# Patient Record
Sex: FEMALE | Race: WHITE | NOT HISPANIC OR LATINO | Employment: FULL TIME | URBAN - METROPOLITAN AREA
[De-identification: names, ages, dates, MRNs, and addresses within clinical notes are randomized per-mention and may not be internally consistent; named-entity substitution may affect disease eponyms.]

---

## 2020-11-09 ENCOUNTER — OFFICE VISIT (OUTPATIENT)
Dept: FAMILY MEDICINE | Facility: CLINIC | Age: 45
End: 2020-11-09
Payer: COMMERCIAL

## 2020-11-09 VITALS
HEIGHT: 62 IN | SYSTOLIC BLOOD PRESSURE: 118 MMHG | BODY MASS INDEX: 22.26 KG/M2 | DIASTOLIC BLOOD PRESSURE: 82 MMHG | HEART RATE: 98 BPM | OXYGEN SATURATION: 100 % | TEMPERATURE: 98.5 F | WEIGHT: 121 LBS

## 2020-11-09 DIAGNOSIS — Z00.00 ROUTINE GENERAL MEDICAL EXAMINATION AT A HEALTH CARE FACILITY: Primary | ICD-10-CM

## 2020-11-09 DIAGNOSIS — Z12.31 ENCOUNTER FOR SCREENING MAMMOGRAM FOR BREAST CANCER: ICD-10-CM

## 2020-11-09 PROCEDURE — 99386 PREV VISIT NEW AGE 40-64: CPT | Performed by: NURSE PRACTITIONER

## 2020-11-09 RX ORDER — MULTIVITAMIN WITH IRON
1 TABLET ORAL DAILY
COMMUNITY

## 2020-11-09 ASSESSMENT — MIFFLIN-ST. JEOR: SCORE: 1154.16

## 2020-11-09 NOTE — PROGRESS NOTES
SUBJECTIVE:   CC: Melia Hernández is an 44 year old woman who presents for preventive health visit.       Patient has been advised of split billing requirements and indicates understanding: Yes  Healthy Habits:    Do you get at least three servings of calcium containing foods daily (dairy, green leafy vegetables, etc.)? yes    Amount of exercise or daily activities, outside of work: walks    Problems taking medications regularly No    Medication side effects: No    Have you had an eye exam in the past two years? yes    Do you see a dentist twice per year? yes    Do you have sleep apnea, excessive snoring or daytime drowsiness?no          Today's PHQ-2 Score: No flowsheet data found.    Abuse: Current or Past(Physical, Sexual or Emotional)- No  Do you feel safe in your environment? Yes    PAP done at [facility] in past couple years. Will request records.     Social History     Tobacco Use     Smoking status: Never Smoker   Substance Use Topics     Alcohol use: No     If you drink alcohol do you typically have >3 drinks per day or >7 drinks per week? No                     Reviewed orders with patient.  Reviewed health maintenance and updated orders accordingly - Yes  Lab work is in process    Mammogram Screening: Patient over age 50, mutual decision to screen reflected in health maintenance.    Pertinent mammograms are reviewed under the imaging tab.  History of abnormal Pap smear: NO - age 30-65 PAP every 5 years with negative HPV co-testing recommended     Reviewed and updated as needed this visit by clinical staff                 Reviewed and updated as needed this visit by Provider                Past Medical History:   Diagnosis Date     NO ACTIVE PROBLEMS       Past Surgical History:   Procedure Laterality Date     ENT SURGERY      PET     LABIALPLASTY N/A 6/26/2015    Procedure: LABIAPLASTY;  Surgeon: Carlotta Butcher MD;  Location: [facility]     NO HISTORY OF SURGERY       wisdom teeth          ROS:  CONSTITUTIONAL: NEGATIVE for fever, chills, change in weight  INTEGUMENTARU/SKIN: NEGATIVE for worrisome rashes, moles or lesions  EYES: NEGATIVE for vision changes or irritation  ENT: NEGATIVE for ear, mouth and throat problems  RESP: NEGATIVE for significant cough or SOB  BREAST: NEGATIVE for masses, tenderness or discharge  CV: NEGATIVE for chest pain, palpitations or peripheral edema  GI: NEGATIVE for nausea, abdominal pain, heartburn, or change in bowel habits  : NEGATIVE for unusual urinary or vaginal symptoms. Periods are regular.  MUSCULOSKELETAL: NEGATIVE for significant arthralgias or myalgia  NEURO: NEGATIVE for weakness, dizziness or paresthesias  PSYCHIATRIC: NEGATIVE for changes in mood or affect    OBJECTIVE:   There were no vitals taken for this visit.  EXAM:  GENERAL: healthy, alert and no distress  EYES: Eyes grossly normal to inspection, PERRL and conjunctivae and sclerae normal  HENT: ear canals and TM's normal, nose and mouth without ulcers or lesions  NECK: no adenopathy, no asymmetry, masses, or scars and thyroid normal to palpation  RESP: lungs clear to auscultation - no rales, rhonchi or wheezes  BREAST: normal without masses, tenderness or nipple discharge and no palpable axillary masses or adenopathy  CV: regular rate and rhythm, normal S1 S2, no S3 or S4, no murmur, click or rub, no peripheral edema and peripheral pulses strong  ABDOMEN: soft, nontender, no hepatosplenomegaly, no masses and bowel sounds normal  MS: no gross musculoskeletal defects noted, no edema  SKIN: no suspicious lesions or rashes  NEURO: Normal strength and tone, mentation intact and speech normal  PSYCH: mentation appears normal, affect normal/bright    Diagnostic Test Results:  Labs reviewed in Epic    ASSESSMENT/PLAN:       ICD-10-CM    1. Routine general medical examination at a health care facility  Z00.00 Lipid panel reflex to direct LDL Fasting     Comprehensive metabolic panel (BMP + Alb, Alk  "Phos, ALT, AST, Total. Bili, TP)     TSH with free T4 reflex   2. Encounter for screening mammogram for breast cancer  Z12.31 MA Screening Digital Bilateral       Patient has been advised of split billing requirements and indicates understanding: Yes  COUNSELING:   Reviewed preventive health counseling, as reflected in patient instructions  Special attention given to:        Regular exercise       Healthy diet/nutrition    Estimated body mass index is 20.67 kg/m  as calculated from the following:    Height as of 6/26/15: 1.575 m (5' 2\").    Weight as of 6/26/15: 51.3 kg (113 lb).        She reports that she has never smoked. She does not have any smokeless tobacco history on file.      Counseling Resources:  ATP IV Guidelines  Pooled Cohorts Equation Calculator  Breast Cancer Risk Calculator  BRCA-Related Cancer Risk Assessment: FHS-7 Tool  FRAX Risk Assessment  ICSI Preventive Guidelines  Dietary Guidelines for Americans, 2010  USDA's MyPlate  ASA Prophylaxis  Lung CA Screening    Eun Schroeder, KD  [facility]  "

## 2021-01-15 ENCOUNTER — HEALTH MAINTENANCE LETTER (OUTPATIENT)
Age: 46
End: 2021-01-15

## 2021-09-04 ENCOUNTER — HEALTH MAINTENANCE LETTER (OUTPATIENT)
Age: 46
End: 2021-09-04

## 2021-12-25 ENCOUNTER — HEALTH MAINTENANCE LETTER (OUTPATIENT)
Age: 46
End: 2021-12-25

## 2022-03-09 ENCOUNTER — OFFICE VISIT (OUTPATIENT)
Dept: FAMILY MEDICINE | Facility: CLINIC | Age: 47
End: 2022-03-09
Payer: COMMERCIAL

## 2022-03-09 VITALS
BODY MASS INDEX: 23 KG/M2 | SYSTOLIC BLOOD PRESSURE: 100 MMHG | TEMPERATURE: 99.8 F | HEIGHT: 62 IN | HEART RATE: 100 BPM | WEIGHT: 125 LBS | OXYGEN SATURATION: 98 % | DIASTOLIC BLOOD PRESSURE: 60 MMHG

## 2022-03-09 DIAGNOSIS — B37.31 YEAST INFECTION OF THE VAGINA: ICD-10-CM

## 2022-03-09 DIAGNOSIS — J02.9 SORE THROAT: ICD-10-CM

## 2022-03-09 DIAGNOSIS — J02.0 STREPTOCOCCAL SORE THROAT: Primary | ICD-10-CM

## 2022-03-09 LAB
DEPRECATED S PYO AG THROAT QL EIA: POSITIVE
FLUAV AG SPEC QL IA: NEGATIVE
FLUBV AG SPEC QL IA: NEGATIVE
SARS-COV-2 RNA RESP QL NAA+PROBE: NEGATIVE

## 2022-03-09 PROCEDURE — U0005 INFEC AGEN DETEC AMPLI PROBE: HCPCS | Performed by: PHYSICIAN ASSISTANT

## 2022-03-09 PROCEDURE — 87804 INFLUENZA ASSAY W/OPTIC: CPT | Performed by: PHYSICIAN ASSISTANT

## 2022-03-09 PROCEDURE — 87880 STREP A ASSAY W/OPTIC: CPT | Performed by: PHYSICIAN ASSISTANT

## 2022-03-09 PROCEDURE — 99214 OFFICE O/P EST MOD 30 MIN: CPT | Performed by: PHYSICIAN ASSISTANT

## 2022-03-09 PROCEDURE — U0003 INFECTIOUS AGENT DETECTION BY NUCLEIC ACID (DNA OR RNA); SEVERE ACUTE RESPIRATORY SYNDROME CORONAVIRUS 2 (SARS-COV-2) (CORONAVIRUS DISEASE [COVID-19]), AMPLIFIED PROBE TECHNIQUE, MAKING USE OF HIGH THROUGHPUT TECHNOLOGIES AS DESCRIBED BY CMS-2020-01-R: HCPCS | Performed by: PHYSICIAN ASSISTANT

## 2022-03-09 RX ORDER — FLUCONAZOLE 150 MG/1
150 TABLET ORAL ONCE
Qty: 1 TABLET | Refills: 0 | Status: SHIPPED | OUTPATIENT
Start: 2022-03-09 | End: 2022-03-09

## 2022-03-09 RX ORDER — AMOXICILLIN 500 MG/1
500 CAPSULE ORAL 3 TIMES DAILY
Qty: 30 CAPSULE | Refills: 0 | Status: SHIPPED | OUTPATIENT
Start: 2022-03-09 | End: 2022-03-19

## 2022-03-09 ASSESSMENT — ENCOUNTER SYMPTOMS: SORE THROAT: 1

## 2022-03-09 NOTE — PROGRESS NOTES
Assessment & Plan     Streptococcal sore throat  Sore throat  Symptomatic strep throat.  Reviewed recurrence with patient as she seems to have had positive strep tests numerous times since she turned the age of 42.  She does have fairly prominent tonsillar hypertrophy.  Advised that if she ever wanted to have a consultation with ENT she is more than welcome to reach out to our office and we can place that consult order.  - Streptococcus A Rapid Screen w/Reflex to PCR - Clinic Collect  - Symptomatic; Yes; 3/6/2022 COVID-19 Virus (Coronavirus) by PCR Nose  - Influenza A/B antigen  - amoxicillin (AMOXIL) 500 MG capsule  Dispense: 30 capsule; Refill: 0    Yeast infection of the vagina  Historically reports yeast candidiasis with antibiotic use.  Will send over fluconazole empirically.  - fluconazole (DIFLUCAN) 150 MG tablet  Dispense: 1 tablet; Refill: 0    Return in about 3 months (around 6/9/2022) for Physical Exam, PAP, Fasting labs.    [facility]   Melia is a 46 year old who presents for the following health issues     Pharyngitis     History of Present Illness       Reason for visit:  Sore throat  Symptom onset:  1-3 days ago  Symptoms include:  Sore throat, swollen tonsils    She eats 2-3 servings of fruits and vegetables daily.She consumes 0 sweetened beverage(s) daily.She exercises with enough effort to increase her heart rate 9 or less minutes per day.  She exercises with enough effort to increase her heart rate 3 or less days per week.   She is taking medications regularly.         COVID-19 Symptom Review  How many days ago did these symptoms start? 3 days    Are any of the following symptoms significant for you?    New or worsening difficulty breathing? No    Worsening cough? No    Fever or chills? No    Headache: no    Sore throat: YES    Chest pain: no    Diarrhea: no    Body aches? no    What treatments has patient tried?  Salt water  "gargles, Tylenol  Does patient live in a nursing home, group home, or shelter? no  Does patient have a way to get food/medications during quarantined? Yes, I have a friend or family member who can help me.    Review of Systems   HENT: Positive for sore throat.       Constitutional, HEENT, cardiovascular, pulmonary, GI, , musculoskeletal, neuro, skin, endocrine and psych systems are negative, except as otherwise noted.      Objective    /60   Pulse 100   Temp 99.8  F (37.7  C)   Ht 1.575 m (5' 2\")   Wt 56.7 kg (125 lb)   LMP 02/23/2022   SpO2 98%   BMI 22.86 kg/m    Body mass index is 22.86 kg/m .  Physical Exam   GENERAL: healthy, alert and no distress  EYES: Eyes grossly normal to inspection, PERRL and conjunctivae and sclerae normal  HENT: ear canals and TM's normal, nose and posterior pharynx erythematous with 3+ tonsillar hypertrophy without exudate  NECK: Bilateral anterior cervical lymphadenopathy, no asymmetry, masses, or scars and thyroid normal to palpation  RESP: lungs clear to auscultation - no rales, rhonchi or wheezes  CV: regular rate and rhythm, normal S1 S2, no S3 or S4, no murmur, click or rub, no peripheral edema and peripheral pulses strong  MS: no gross musculoskeletal defects noted, no edema  SKIN: no suspicious lesions or rashes  NEURO: Normal strength and tone, mentation intact and speech normal  PSYCH: mentation appears normal, affect normal/bright    Results for orders placed or performed in visit on 03/09/22 (from the past 24 hour(s))   Streptococcus A Rapid Screen w/Reflex to PCR - Clinic Collect    Specimen: Throat; Swab   Result Value Ref Range    Group A Strep antigen Positive (A) Negative   Influenza A/B antigen    Specimen: Nose; Swab   Result Value Ref Range    Influenza A antigen Negative Negative    Influenza B antigen Negative Negative    Narrative    Test results must be correlated with clinical data. If necessary, results should be confirmed by a molecular assay " or viral culture.

## 2022-03-09 NOTE — RESULT ENCOUNTER NOTE
Melia  Here are your recent results.      Your influenza result is negative.      As we discussed in the clinic, your strep test is positive.  Please start the amoxicillin as prescribed.      If you have any questions please do not hesitate to contact our office via phone ([phone]) or MyChart.    Miri Alba MS, PAVishnuC  [facility]

## 2022-03-10 NOTE — RESULT ENCOUNTER NOTE
Melia  Here are your recent results.      Your Covid test is negative.      If you have any questions please do not hesitate to contact our office via phone ([phone]) or MyChart.    Miri Alba MS, PA-C  [facility]

## 2022-03-16 ENCOUNTER — MYC MEDICAL ADVICE (OUTPATIENT)
Dept: FAMILY MEDICINE | Facility: CLINIC | Age: 47
End: 2022-03-16
Payer: COMMERCIAL

## 2022-03-16 DIAGNOSIS — B37.31 YEAST INFECTION OF THE VAGINA: Primary | ICD-10-CM

## 2022-03-16 NOTE — TELEPHONE ENCOUNTER
My chart message sent     America Guzman RN, BSN  [facility]

## 2022-03-22 RX ORDER — FLUCONAZOLE 150 MG/1
150 TABLET ORAL ONCE
Qty: 1 TABLET | Refills: 0 | Status: SHIPPED | OUTPATIENT
Start: 2022-03-22 | End: 2022-03-22

## 2022-03-22 NOTE — TELEPHONE ENCOUNTER
Please see my chart message below     Please review and advise     Thank you     America Guzman RN, BSN  [facility]

## 2022-04-15 ENCOUNTER — TRANSFERRED RECORDS (OUTPATIENT)
Dept: HEALTH INFORMATION MANAGEMENT | Facility: CLINIC | Age: 47
End: 2022-04-15
Payer: COMMERCIAL

## 2022-05-10 ENCOUNTER — TRANSFERRED RECORDS (OUTPATIENT)
Dept: MULTI SPECIALTY CLINIC | Facility: CLINIC | Age: 47
End: 2022-05-10

## 2022-05-10 LAB — PAP SMEAR - HIM PATIENT REPORTED: NEGATIVE

## 2022-05-11 ENCOUNTER — TRANSFERRED RECORDS (OUTPATIENT)
Dept: HEALTH INFORMATION MANAGEMENT | Facility: CLINIC | Age: 47
End: 2022-05-11

## 2022-09-26 ENCOUNTER — OFFICE VISIT (OUTPATIENT)
Dept: OBGYN | Facility: CLINIC | Age: 47
End: 2022-09-26
Payer: COMMERCIAL

## 2022-09-26 VITALS
HEIGHT: 62 IN | DIASTOLIC BLOOD PRESSURE: 70 MMHG | SYSTOLIC BLOOD PRESSURE: 108 MMHG | WEIGHT: 128 LBS | BODY MASS INDEX: 23.55 KG/M2

## 2022-09-26 DIAGNOSIS — B96.89 BACTERIAL VAGINOSIS: ICD-10-CM

## 2022-09-26 DIAGNOSIS — N89.8 VAGINAL ITCHING: Primary | ICD-10-CM

## 2022-09-26 DIAGNOSIS — N76.0 BACTERIAL VAGINOSIS: ICD-10-CM

## 2022-09-26 LAB
CLUE CELLS: PRESENT
TRICHOMONAS, WET PREP: ABNORMAL
WBC'S/HIGH POWER FIELD, WET PREP: ABNORMAL
YEAST, WET PREP: ABNORMAL

## 2022-09-26 PROCEDURE — 87591 N.GONORRHOEAE DNA AMP PROB: CPT | Performed by: OBSTETRICS & GYNECOLOGY

## 2022-09-26 PROCEDURE — 99203 OFFICE O/P NEW LOW 30 MIN: CPT | Performed by: OBSTETRICS & GYNECOLOGY

## 2022-09-26 PROCEDURE — 87491 CHLMYD TRACH DNA AMP PROBE: CPT | Performed by: OBSTETRICS & GYNECOLOGY

## 2022-09-26 PROCEDURE — 87210 SMEAR WET MOUNT SALINE/INK: CPT | Performed by: OBSTETRICS & GYNECOLOGY

## 2022-09-26 RX ORDER — NORETHINDRONE ACETATE AND ETHINYL ESTRADIOL 1MG-20(21)
1 KIT ORAL DAILY
COMMUNITY
Start: 2022-09-16

## 2022-09-26 RX ORDER — METRONIDAZOLE 500 MG/1
500 TABLET ORAL 2 TIMES DAILY
Qty: 14 TABLET | Refills: 0 | Status: SHIPPED | OUTPATIENT
Start: 2022-09-26 | End: 2022-10-03

## 2022-09-26 NOTE — NURSING NOTE
"Chief Complaint   Patient presents with     Vaginal Problem     Sore and itchy       Initial /70 (BP Location: Left arm, Patient Position: Chair, Cuff Size: Adult Regular)   Ht 1.575 m (5' 2\")   Wt 58.1 kg (128 lb)   LMP 2022   Breastfeeding No   BMI 23.41 kg/m   Estimated body mass index is 23.41 kg/m  as calculated from the following:    Height as of this encounter: 1.575 m (5' 2\").    Weight as of this encounter: 58.1 kg (128 lb).  BP completed using cuff size: regular    Questioned patient about current smoking habits.  Pt. has never smoked.          The following HM Due: NONE    Johanna Brooks CMA    "

## 2022-09-26 NOTE — PROGRESS NOTES
"  SUBJECTIVE:                                                   CC:  Patient presents with:  Vaginal Problem: Sore and itchy      HPI:  Melia Hernández is a 46 year old  (three daughters) who presents with abnormal discharge.    She has a primary OB at [facility].  Up to date on pap smears  The discharge is not malodorous.  Slightly more than her usual, just a little bit of mild external irritation.  Is on OCP x2 years to regulate her period, which had been coming every 2-3 weeks.  Had a pelvic US which was normal at the time.     Gyn History:  Patient's last menstrual period was 2022.      Using oral contraceptives and vasectomy for contraception.    Last 3 Pap and HPV Results:  at her regular clinic    PMH, PSH, Soc Hx, Fam Hx, Meds, and allergies reviewed in Epic.    OBJECTIVE:     /70 (BP Location: Left arm, Patient Position: Chair, Cuff Size: Adult Regular)   Ht 1.575 m (5' 2\")   Wt 58.1 kg (128 lb)   LMP 2022   Breastfeeding No   BMI 23.41 kg/m      Gen: Healthy appearing female, no acute distress, comfortable  Psych: mentation appears normal and affect bright  : Normal external female genitalia.  No external lesions.   SSE: Speculum exam reveals vaginal epithelium well rugated with normal physiologic discharge. Cervix appears smooth, pink, with no visible lesions.     Test Results:  Results for orders placed or performed in visit on 22 (from the past 24 hour(s))   Wet preparation    Specimen: Vagina; Swab   Result Value Ref Range    Trichomonas Absent Absent    Yeast Absent Absent    Clue Cells Present (A) Absent    WBCs/high power field 3+ (A) None       ASSESSMENT/PLAN:                                                      1. Vaginal itching  - NEISSERIA GONORRHOEA PCR  - CHLAMYDIA TRACHOMATIS PCR  - Wet preparation    2. Bacterial vaginosis  - metroNIDAZOLE (FLAGYL) 500 MG tablet; Take 1 tablet (500 mg) by mouth 2 times daily for 7 days  Dispense: 14 tablet; " Refill: 0     Luz Hardy MD, MPH  Obstetrics and Gynecology

## 2022-09-27 LAB
C TRACH DNA SPEC QL NAA+PROBE: NEGATIVE
N GONORRHOEA DNA SPEC QL NAA+PROBE: NEGATIVE

## 2022-10-22 ENCOUNTER — HEALTH MAINTENANCE LETTER (OUTPATIENT)
Age: 47
End: 2022-10-22

## 2022-12-04 ENCOUNTER — HEALTH MAINTENANCE LETTER (OUTPATIENT)
Age: 47
End: 2022-12-04

## 2023-01-19 ENCOUNTER — OFFICE VISIT (OUTPATIENT)
Dept: FAMILY MEDICINE | Facility: CLINIC | Age: 48
End: 2023-01-19
Payer: COMMERCIAL

## 2023-01-19 VITALS
DIASTOLIC BLOOD PRESSURE: 66 MMHG | TEMPERATURE: 98.8 F | HEIGHT: 62 IN | OXYGEN SATURATION: 99 % | SYSTOLIC BLOOD PRESSURE: 110 MMHG | BODY MASS INDEX: 23.79 KG/M2 | WEIGHT: 129.3 LBS | RESPIRATION RATE: 16 BRPM | HEART RATE: 112 BPM

## 2023-01-19 DIAGNOSIS — T14.8XXA BLOOD BLISTER: Primary | ICD-10-CM

## 2023-01-19 PROCEDURE — 90472 IMMUNIZATION ADMIN EACH ADD: CPT | Performed by: PHYSICIAN ASSISTANT

## 2023-01-19 PROCEDURE — 90471 IMMUNIZATION ADMIN: CPT | Performed by: PHYSICIAN ASSISTANT

## 2023-01-19 PROCEDURE — 90715 TDAP VACCINE 7 YRS/> IM: CPT | Performed by: PHYSICIAN ASSISTANT

## 2023-01-19 PROCEDURE — 10140 I&D HMTMA SEROMA/FLUID COLLJ: CPT | Performed by: PHYSICIAN ASSISTANT

## 2023-01-19 PROCEDURE — 90686 IIV4 VACC NO PRSV 0.5 ML IM: CPT | Performed by: PHYSICIAN ASSISTANT

## 2023-01-19 NOTE — PROGRESS NOTES
"  Assessment & Plan     Blood blister  Drained per procedure note below.  Pt tolerated well.  Wound care discussed in detail.  - Hematoma/Fluid Drainage [70088]      Return in about 1 week (around 1/26/2023) for evaluation if not healing as expected.    [facility] IRVING Cárdenas is a 47 year old, presenting for the following health issues:  Derm Problem      History of Present Illness       Reason for visit:  Blood blister on neck/shoulder area  Symptom onset:  3-4 weeks ago  Symptom intensity:  Mild  Symptom progression:  Worsening  Had these symptoms before:  No    She eats 2-3 servings of fruits and vegetables daily.She consumes 0 sweetened beverage(s) daily.She exercises with enough effort to increase her heart rate 9 or less minutes per day.  She exercises with enough effort to increase her heart rate 3 or less days per week.   She is taking medications regularly.       Concern - Right Blood blister on neck shoulder area  Onset: 1 Month Ago  Description: Round size of a pea   Intensity: mild  Progression of Symptoms:  worsening  Accompanying Signs & Symptoms: none  Previous history of similar problem: none  Precipitating factors:        Worsened by: none  Alleviating factors:        Improved by: none  Therapies tried and outcome: None    Review of Systems   Constitutional, HEENT, cardiovascular, pulmonary, GI, , musculoskeletal, neuro, skin, endocrine and psych systems are negative, except as otherwise noted.      Objective    /66   Pulse 112   Temp 98.8  F (37.1  C) (Tympanic)   Resp 16   Ht 1.575 m (5' 2\")   Wt 58.7 kg (129 lb 4.8 oz)   LMP 01/04/2023 (Approximate)   SpO2 99%   BMI 23.65 kg/m    Body mass index is 23.65 kg/m .  Physical Exam   GENERAL: healthy, alert and no distress  EYES: Eyes grossly normal to inspection  MS: no gross musculoskeletal defects noted, no edema  SKIN: tense, pedunculated, violaceous skin tag with blood " collection within the tissue on the right side of the neck measuring 2.5 cm at its greatest diameter just inferior to the right ear.  No evidence of infection (warmth/tenderness)  NEURO: Normal strength and tone, mentation intact and speech normal  PSYCH: mentation appears normal, affect normal/bright    PROCEDURE:   After discussing the risks, benefits and alternatives to an incision and drainage procedure - the patient elected to proceed with this procedure.  The area was prepped with a betadine solution.  The area was anesthetized with 4 cc's of 1% lidocaine.  Using a sterile technique and a #11 blade, the blister was drained and serosanguinous fluid was removed.  The tissue was then cauterized to ensure no further blood collections would form.  The tissue The patient tolerated this procedure well and there were no immediate adverse effects.  The area was then cleaned and bandaged with antibiotic ointment.  Wound care instructions was explained in detail and a printout of instructions were given to the patient.

## 2023-02-28 ENCOUNTER — MYC MEDICAL ADVICE (OUTPATIENT)
Dept: FAMILY MEDICINE | Facility: CLINIC | Age: 48
End: 2023-02-28
Payer: COMMERCIAL

## 2023-02-28 DIAGNOSIS — T14.8XXA BLOOD BLISTER: Primary | ICD-10-CM

## 2023-03-01 NOTE — TELEPHONE ENCOUNTER
Please see my chart message below     Please review and advise     Thank you     America Guzman RN, BSN  [facility] [place] [facility]

## 2023-03-02 ENCOUNTER — TELEPHONE (OUTPATIENT)
Dept: OTHER | Facility: CLINIC | Age: 48
End: 2023-03-02
Payer: COMMERCIAL

## 2023-03-02 NOTE — TELEPHONE ENCOUNTER
Who is the name of the provider: NONE - NEW pt     Person calling / Facility: Melia    Phone number: [phone]    Can we leave a detailed message on this number? Yes    Reason for call:   Pt was initially referred to [facility] for the blood blister. Pt was given our number this morning to schedule with us instead as they think its very slow leaking blood vessel that is incredibly stubborn.

## 2023-03-02 NOTE — TELEPHONE ENCOUNTER
Patient was referred to general surgery. Patient was told they do not see patients for blood blisters/hematomas.   Discussed with Dr. Saab and ok to schedule at [facility].  Please schedule with Dr. Joshi on Monday as he has some openings.    Appt note: Referral for blood blister on neck/shoulder area.    Camille SILVER, RN    [facility]  Office: [phone]  Fax: [phone]

## 2023-03-13 ENCOUNTER — TELEPHONE (OUTPATIENT)
Dept: OTHER | Facility: CLINIC | Age: 48
End: 2023-03-13

## 2023-03-13 ENCOUNTER — OFFICE VISIT (OUTPATIENT)
Dept: OTHER | Facility: CLINIC | Age: 48
End: 2023-03-13
Attending: SURGERY
Payer: COMMERCIAL

## 2023-03-13 VITALS — OXYGEN SATURATION: 100 % | DIASTOLIC BLOOD PRESSURE: 77 MMHG | HEART RATE: 96 BPM | SYSTOLIC BLOOD PRESSURE: 113 MMHG

## 2023-03-13 DIAGNOSIS — L98.9 SKIN LESION: Primary | ICD-10-CM

## 2023-03-13 PROCEDURE — G0463 HOSPITAL OUTPT CLINIC VISIT: HCPCS

## 2023-03-13 PROCEDURE — 99203 OFFICE O/P NEW LOW 30 MIN: CPT | Performed by: SURGERY

## 2023-03-13 PROCEDURE — 99212 OFFICE O/P EST SF 10 MIN: CPT | Performed by: SURGERY

## 2023-03-13 NOTE — PATIENT INSTRUCTIONS
[facility]  Pre-operative instructions    Please review the following carefully in preparation for your upcoming procedure.  You will also receive a surgery packet in the mail.  Please review the packet thoroughly, as it will provide more details.  You may contact us [phone] with any questions or concerns.    Surgery: EXCISION RIGHT SHOULDER SOFT TISSUE LESION.  Surgeon: Serge Joshi MD  Location:  [facility] - [address] [place] [address] [address], [place] [address]    Pre-procedure  You will need a preop exam.  Dr. Joshi has completed your pre op exam at this office visit.     Diet  Do not eat anything 8 hours prior to arrival time (This includes gum and mints).   You may have clear liquids until 2 hours prior to arrival time.   Clear liquids include water, soda, apple or cranberry juice, Jello, popsicles, black coffee or tea.  Dairy products and Tomato products are NOT considered Clear Liquids)   Our surgery scheduler will confirm these times for you when you are scheduled.      Medications  Your PCP will review your medication with you at your preop exam.  They will discuss if you need to adjust or hold any medications prior to your surgery.  If you are taking a blood thinner, you may need to stop taking it prior to your procedure.  Blood thinner name:  NOT ON BLOOD THINNER NOR ASPIRIN.      Additional Information  You will need a responsible adult drive you home when your are discharged from the hospital and stay with you for 24 to 48 hours.   Take a shower or bath the morning of surgery.  Please remove deodorant, cologne, lotion, makeup, nail polish, jewelry, watches.  It is sometimes necessary to adjust the surgery schedule due to emergencies and unforeseen changes.  If your surgery is affected by this, we greatly appreciate your flexibility and understanding in this matter.  Please allow 48 hours for refills on medications.  Do not call the on-call doctor for a refill on your  pain medications.  You will see the surgeon/NP approximately 2 weeks after your procedure for a post-op appointment.   NADEEM Coffey, RN  [facility]  Office:  [phone] Fax: [phone]

## 2023-03-13 NOTE — PROGRESS NOTES
Patient is here to discuss consult    /77 (BP Location: Left arm, Patient Position: Chair, Cuff Size: Adult Regular)   Pulse 96   LMP 01/04/2023 (Approximate)   SpO2 100%     Questions patient would like addressed today are: N/A.    Refills are needed: No    Has homecare services and agency name:  [facility]

## 2023-03-13 NOTE — PROGRESS NOTES
I had the pleasure of seeing Mrs. Melia Hernández in the vascular surgery clinic today.  She is a very pleasant 47-year-old female who had developed a blood blister in the right trapezius area.  It has been excised twice but has probably recurred.    On my examination: Vital signs reviewed.  Mental: Alert and oriented x4, judgment is adequate.  HEENT: Head is atraumatic and normocephalic, mucosa pink.  Lymphatics: No supraclavicular or cervical adenopathy noted.  Cardiac: Regular rate and rhythm, S1 plus S2 +0.  Chest: Clear to auscultation bilaterally.  There is a 4 mm blood blister in the right trapezius area.    Patient only previous surgery was done under general anesthesia and patient did not have any problems with anesthesia or scarring.  She tolerates local anesthetic quite well.    We will plan on doing an operative excision in the surgical suite.

## 2023-03-15 NOTE — TELEPHONE ENCOUNTER
SALVATORE for patient to call to inform her of her scheduled surgery date/time of Tuesday, 3/29/23 @ 7:30am with a check-in [facility] of 5:30am [facility].    The pre-op physical was done on 3/13/23 by Dr. Joshi.    Patient needs to be scheduled for a post-op appointment.

## 2023-03-24 NOTE — TELEPHONE ENCOUNTER
Patient requested April.  Surgery rescheduled to 04/21/23.  Notified patient of new date/times and changed pre-op.  Informed patient she will need to get a new pre-op.

## 2023-04-01 ENCOUNTER — HEALTH MAINTENANCE LETTER (OUTPATIENT)
Age: 48
End: 2023-04-01

## 2023-04-21 ENCOUNTER — ANESTHESIA (OUTPATIENT)
Dept: SURGERY | Facility: CLINIC | Age: 48
End: 2023-04-21
Payer: COMMERCIAL

## 2023-04-21 ENCOUNTER — HOSPITAL ENCOUNTER (OUTPATIENT)
Facility: CLINIC | Age: 48
Discharge: HOME | End: 2023-04-21
Attending: SURGERY | Admitting: SURGERY
Payer: COMMERCIAL

## 2023-04-21 ENCOUNTER — ANESTHESIA EVENT (OUTPATIENT)
Dept: SURGERY | Facility: CLINIC | Age: 48
End: 2023-04-21
Payer: COMMERCIAL

## 2023-04-21 ENCOUNTER — APPOINTMENT (OUTPATIENT)
Dept: SURGERY | Facility: PHYSICIAN GROUP | Age: 48
End: 2023-04-21
Payer: COMMERCIAL

## 2023-04-21 VITALS
RESPIRATION RATE: 16 BRPM | TEMPERATURE: 98.9 F | BODY MASS INDEX: 23.98 KG/M2 | WEIGHT: 127 LBS | SYSTOLIC BLOOD PRESSURE: 99 MMHG | OXYGEN SATURATION: 99 % | DIASTOLIC BLOOD PRESSURE: 60 MMHG | HEIGHT: 61 IN | HEART RATE: 74 BPM

## 2023-04-21 DIAGNOSIS — L98.9 SKIN LESION: Primary | ICD-10-CM

## 2023-04-21 DIAGNOSIS — N90.60 HYPERTROPHY OF LABIA: Primary | ICD-10-CM

## 2023-04-21 LAB — HCG UR QL: NEGATIVE

## 2023-04-21 PROCEDURE — 11421 EXC H-F-NK-SP B9+MARG 0.6-1: CPT | Performed by: SURGERY

## 2023-04-21 PROCEDURE — 710N000009 HC RECOVERY PHASE 1, LEVEL 1, PER MIN: Performed by: SURGERY

## 2023-04-21 PROCEDURE — 710N000012 HC RECOVERY PHASE 2, PER MINUTE: Performed by: SURGERY

## 2023-04-21 PROCEDURE — 360N000075 HC SURGERY LEVEL 2, PER MIN: Performed by: SURGERY

## 2023-04-21 PROCEDURE — 88341 IMHCHEM/IMCYTCHM EA ADD ANTB: CPT | Mod: 26 | Performed by: PATHOLOGY

## 2023-04-21 PROCEDURE — 370N000017 HC ANESTHESIA TECHNICAL FEE, PER MIN: Performed by: SURGERY

## 2023-04-21 PROCEDURE — 272N000001 HC OR GENERAL SUPPLY STERILE: Performed by: SURGERY

## 2023-04-21 PROCEDURE — 258N000003 HC RX IP 258 OP 636: Performed by: ANESTHESIOLOGY

## 2023-04-21 PROCEDURE — 250N000009 HC RX 250: Performed by: NURSE ANESTHETIST, CERTIFIED REGISTERED

## 2023-04-21 PROCEDURE — 250N000011 HC RX IP 250 OP 636: Performed by: SURGERY

## 2023-04-21 PROCEDURE — 81025 URINE PREGNANCY TEST: CPT | Performed by: ANESTHESIOLOGY

## 2023-04-21 PROCEDURE — 250N000011 HC RX IP 250 OP 636: Performed by: NURSE ANESTHETIST, CERTIFIED REGISTERED

## 2023-04-21 PROCEDURE — 88305 TISSUE EXAM BY PATHOLOGIST: CPT | Mod: 26 | Performed by: PATHOLOGY

## 2023-04-21 PROCEDURE — 250N000009 HC RX 250: Performed by: SURGERY

## 2023-04-21 PROCEDURE — 258N000003 HC RX IP 258 OP 636: Performed by: NURSE ANESTHETIST, CERTIFIED REGISTERED

## 2023-04-21 PROCEDURE — 999N000141 HC STATISTIC PRE-PROCEDURE NURSING ASSESSMENT: Performed by: SURGERY

## 2023-04-21 PROCEDURE — 88305 TISSUE EXAM BY PATHOLOGIST: CPT | Mod: TC | Performed by: SURGERY

## 2023-04-21 PROCEDURE — 88342 IMHCHEM/IMCYTCHM 1ST ANTB: CPT | Mod: 26 | Performed by: PATHOLOGY

## 2023-04-21 RX ORDER — TRAMADOL HYDROCHLORIDE 50 MG/1
50 TABLET ORAL EVERY 6 HOURS PRN
Qty: 20 TABLET | Refills: 1 | Status: SHIPPED | OUTPATIENT
Start: 2023-04-21 | End: 2023-05-01

## 2023-04-21 RX ORDER — DEXMEDETOMIDINE HYDROCHLORIDE 4 UG/ML
INJECTION, SOLUTION INTRAVENOUS PRN
Status: DISCONTINUED | OUTPATIENT
Start: 2023-04-21 | End: 2023-04-21

## 2023-04-21 RX ORDER — ACETAMINOPHEN 325 MG/1
650 TABLET ORAL
Status: DISCONTINUED | OUTPATIENT
Start: 2023-04-21 | End: 2023-04-21 | Stop reason: HOSPADM

## 2023-04-21 RX ORDER — SODIUM CHLORIDE, SODIUM LACTATE, POTASSIUM CHLORIDE, CALCIUM CHLORIDE 600; 310; 30; 20 MG/100ML; MG/100ML; MG/100ML; MG/100ML
INJECTION, SOLUTION INTRAVENOUS CONTINUOUS
Status: DISCONTINUED | OUTPATIENT
Start: 2023-04-21 | End: 2023-04-21 | Stop reason: HOSPADM

## 2023-04-21 RX ORDER — LIDOCAINE HYDROCHLORIDE AND EPINEPHRINE 10; 10 MG/ML; UG/ML
INJECTION, SOLUTION INFILTRATION; PERINEURAL PRN
Status: DISCONTINUED | OUTPATIENT
Start: 2023-04-21 | End: 2023-04-21 | Stop reason: HOSPADM

## 2023-04-21 RX ORDER — MEPERIDINE HYDROCHLORIDE 25 MG/ML
12.5 INJECTION INTRAMUSCULAR; INTRAVENOUS; SUBCUTANEOUS EVERY 5 MIN PRN
Status: DISCONTINUED | OUTPATIENT
Start: 2023-04-21 | End: 2023-04-21 | Stop reason: HOSPADM

## 2023-04-21 RX ORDER — OXYCODONE HYDROCHLORIDE 5 MG/1
5 TABLET ORAL
Status: DISCONTINUED | OUTPATIENT
Start: 2023-04-21 | End: 2023-04-21 | Stop reason: HOSPADM

## 2023-04-21 RX ORDER — LIDOCAINE HYDROCHLORIDE 20 MG/ML
INJECTION, SOLUTION INFILTRATION; PERINEURAL PRN
Status: DISCONTINUED | OUTPATIENT
Start: 2023-04-21 | End: 2023-04-21

## 2023-04-21 RX ORDER — PROPOFOL 10 MG/ML
INJECTION, EMULSION INTRAVENOUS CONTINUOUS PRN
Status: DISCONTINUED | OUTPATIENT
Start: 2023-04-21 | End: 2023-04-21

## 2023-04-21 RX ORDER — MAGNESIUM HYDROXIDE 1200 MG/15ML
LIQUID ORAL PRN
Status: DISCONTINUED | OUTPATIENT
Start: 2023-04-21 | End: 2023-04-21 | Stop reason: HOSPADM

## 2023-04-21 RX ORDER — HYDROMORPHONE HCL IN WATER/PF 6 MG/30 ML
0.4 PATIENT CONTROLLED ANALGESIA SYRINGE INTRAVENOUS EVERY 5 MIN PRN
Status: DISCONTINUED | OUTPATIENT
Start: 2023-04-21 | End: 2023-04-21 | Stop reason: HOSPADM

## 2023-04-21 RX ORDER — ACETAMINOPHEN 325 MG/1
650 TABLET ORAL EVERY 4 HOURS PRN
Qty: 50 TABLET | Refills: 0 | Status: SHIPPED | OUTPATIENT
Start: 2023-04-21 | End: 2023-05-04

## 2023-04-21 RX ORDER — HYDRALAZINE HYDROCHLORIDE 20 MG/ML
2.5-5 INJECTION INTRAMUSCULAR; INTRAVENOUS
Status: DISCONTINUED | OUTPATIENT
Start: 2023-04-21 | End: 2023-04-21 | Stop reason: HOSPADM

## 2023-04-21 RX ORDER — CEFAZOLIN SODIUM/WATER 2 G/20 ML
2 SYRINGE (ML) INTRAVENOUS
Status: COMPLETED | OUTPATIENT
Start: 2023-04-21 | End: 2023-04-21

## 2023-04-21 RX ORDER — FENTANYL CITRATE 0.05 MG/ML
50 INJECTION, SOLUTION INTRAMUSCULAR; INTRAVENOUS EVERY 5 MIN PRN
Status: DISCONTINUED | OUTPATIENT
Start: 2023-04-21 | End: 2023-04-21 | Stop reason: HOSPADM

## 2023-04-21 RX ORDER — ONDANSETRON 4 MG/1
4 TABLET, ORALLY DISINTEGRATING ORAL EVERY 30 MIN PRN
Status: DISCONTINUED | OUTPATIENT
Start: 2023-04-21 | End: 2023-04-21 | Stop reason: HOSPADM

## 2023-04-21 RX ORDER — FENTANYL CITRATE 50 UG/ML
INJECTION, SOLUTION INTRAMUSCULAR; INTRAVENOUS PRN
Status: DISCONTINUED | OUTPATIENT
Start: 2023-04-21 | End: 2023-04-21

## 2023-04-21 RX ORDER — ONDANSETRON 2 MG/ML
INJECTION INTRAMUSCULAR; INTRAVENOUS PRN
Status: DISCONTINUED | OUTPATIENT
Start: 2023-04-21 | End: 2023-04-21

## 2023-04-21 RX ORDER — ONDANSETRON 2 MG/ML
4 INJECTION INTRAMUSCULAR; INTRAVENOUS EVERY 30 MIN PRN
Status: DISCONTINUED | OUTPATIENT
Start: 2023-04-21 | End: 2023-04-21 | Stop reason: HOSPADM

## 2023-04-21 RX ORDER — FENTANYL CITRATE 0.05 MG/ML
25 INJECTION, SOLUTION INTRAMUSCULAR; INTRAVENOUS EVERY 5 MIN PRN
Status: DISCONTINUED | OUTPATIENT
Start: 2023-04-21 | End: 2023-04-21 | Stop reason: HOSPADM

## 2023-04-21 RX ORDER — LABETALOL HYDROCHLORIDE 5 MG/ML
5 INJECTION, SOLUTION INTRAVENOUS
Status: DISCONTINUED | OUTPATIENT
Start: 2023-04-21 | End: 2023-04-21 | Stop reason: HOSPADM

## 2023-04-21 RX ORDER — OXYCODONE HYDROCHLORIDE 5 MG/1
10 TABLET ORAL
Status: DISCONTINUED | OUTPATIENT
Start: 2023-04-21 | End: 2023-04-21 | Stop reason: HOSPADM

## 2023-04-21 RX ORDER — HYDROMORPHONE HCL IN WATER/PF 6 MG/30 ML
0.2 PATIENT CONTROLLED ANALGESIA SYRINGE INTRAVENOUS EVERY 5 MIN PRN
Status: DISCONTINUED | OUTPATIENT
Start: 2023-04-21 | End: 2023-04-21 | Stop reason: HOSPADM

## 2023-04-21 RX ORDER — LIDOCAINE 40 MG/G
CREAM TOPICAL
Status: DISCONTINUED | OUTPATIENT
Start: 2023-04-21 | End: 2023-04-21 | Stop reason: HOSPADM

## 2023-04-21 RX ADMIN — MIDAZOLAM 2 MG: 1 INJECTION INTRAMUSCULAR; INTRAVENOUS at 11:38

## 2023-04-21 RX ADMIN — PHENYLEPHRINE HYDROCHLORIDE 100 MCG: 10 INJECTION INTRAVENOUS at 11:58

## 2023-04-21 RX ADMIN — LIDOCAINE HYDROCHLORIDE 60 MG: 20 INJECTION, SOLUTION INFILTRATION; PERINEURAL at 11:40

## 2023-04-21 RX ADMIN — ONDANSETRON 4 MG: 2 INJECTION INTRAMUSCULAR; INTRAVENOUS at 12:05

## 2023-04-21 RX ADMIN — DEXMEDETOMIDINE HYDROCHLORIDE 20 MCG: 200 INJECTION INTRAVENOUS at 11:44

## 2023-04-21 RX ADMIN — FENTANYL CITRATE 50 MCG: 50 INJECTION, SOLUTION INTRAMUSCULAR; INTRAVENOUS at 11:38

## 2023-04-21 RX ADMIN — SODIUM CHLORIDE, POTASSIUM CHLORIDE, SODIUM LACTATE AND CALCIUM CHLORIDE: 600; 310; 30; 20 INJECTION, SOLUTION INTRAVENOUS at 10:03

## 2023-04-21 RX ADMIN — PROPOFOL 100 MCG/KG/MIN: 10 INJECTION, EMULSION INTRAVENOUS at 11:40

## 2023-04-21 RX ADMIN — PHENYLEPHRINE HYDROCHLORIDE 100 MCG: 10 INJECTION INTRAVENOUS at 12:16

## 2023-04-21 RX ADMIN — Medication 2 G: at 11:39

## 2023-04-21 ASSESSMENT — ACTIVITIES OF DAILY LIVING (ADL)
ADLS_ACUITY_SCORE: 35
ADLS_ACUITY_SCORE: 35
ADLS_ACUITY_SCORE: 33

## 2023-04-21 NOTE — DISCHARGE INSTRUCTIONS
Same Day Surgery Discharge Instructions for  Sedation and General Anesthesia     It's not unusual to feel dizzy, light-headed or faint for up to 24 hours after surgery or while taking pain medication.  If you have these symptoms: sit for a few minutes before standing and have someone assist you when you get up to walk or use the bathroom.    You should rest and relax for the next 24 hours. We recommend you make arrangements to have an adult stay with you for at least 24 hours after your discharge.  Avoid hazardous and strenuous activity.    DO NOT DRIVE any vehicle or operate mechanical equipment for 24 hours following the end of your surgery.  Even though you may feel normal, your reactions may be affected by the medication you have received.    Do not drink alcoholic beverages for 24 hours following surgery.     Slowly progress to your regular diet as you feel able. It's not unusual to feel nauseated and/or vomit after receiving anesthesia.  If you develop these symptoms, drink clear liquids (apple juice, ginger ale, broth, 7-up, etc. ) until you feel better.  If your nausea and vomiting persists for 24 hours, please notify your surgeon.      All narcotic pain medications, along with inactivity and anesthesia, can cause constipation. Drinking plenty of liquids and increasing fiber intake will help.    For any questions of a medical nature, call your surgeon.    Do not make important decisions for 24 hours.    If you had general anesthesia, you may have a sore throat for a couple of days related to the breathing tube used during surgery.  You may use Cepacol lozenges to help with this discomfort.  If it worsens or if you develop a fever, contact your surgeon.     If you feel your pain is not well managed with the pain medications prescribed by your surgeon, please contact your surgeon's office to let them know so they can address your concerns.      Reasons to contact your surgeon:    Signs of possible infection:  Check your incision daily for redness, swelling, warmth, red streaks or foul drainage.   Elevated temperature.  Pain not controlled with pain medication and/or rest.   Uncontrolled nausea or vomiting.  Any questions or concerns.       **If you have questions or concerns about your procedure  call Dr. Joshi at [phone]**

## 2023-04-21 NOTE — ANESTHESIA PREPROCEDURE EVALUATION
Anesthesia Pre-Procedure Evaluation    Patient: Melia Hernández   MRN: [id number] : 1975        Procedure : Procedure(s):  EXCISION SOFT TISSUE LESION RIGHT SHOULDER          Past Medical History:   Diagnosis Date     NO ACTIVE PROBLEMS       Past Surgical History:   Procedure Laterality Date     ENT SURGERY      PET     LABIALPLASTY N/A 2015    Procedure: LABIAPLASTY;  Surgeon: Carlotta Butcher MD;  Location: [facility]     NO HISTORY OF SURGERY       wisdom teeth        No Known Allergies   Social History     Tobacco Use     Smoking status: Never     Smokeless tobacco: Never   Vaping Use     Vaping status: Not on file   Substance Use Topics     Alcohol use: Yes     Comment: socially      Wt Readings from Last 1 Encounters:   23 57.6 kg (127 lb)        Anesthesia Evaluation            ROS/MED HX  ENT/Pulmonary:  - neg pulmonary ROS  (-) sleep apnea   Neurologic:  - neg neurologic ROS     Cardiovascular:  - neg cardiovascular ROS     METS/Exercise Tolerance:     Hematologic:       Musculoskeletal:       GI/Hepatic:  - neg GI/hepatic ROS  (-) GERD   Renal/Genitourinary:  - neg Renal ROS     Endo:  - neg endo ROS     Psychiatric/Substance Use:       Infectious Disease:       Malignancy:       Other:            Physical Exam    Airway        Mallampati: II   TM distance: > 3 FB   Neck ROM: full   Mouth opening: > 3 cm    Respiratory Devices and Support         Dental           Cardiovascular          Rhythm and rate: regular     Pulmonary           breath sounds clear to auscultation           OUTSIDE LABS:  CBC:   Lab Results   Component Value Date    WBC 13.5 (H) 2005    HGB 11.4 (L) 2005    HGB 11.8 2005    HCT 34.4 (L) 2005     2005     BMP: No results found for: NA, POTASSIUM, CHLORIDE, CO2, BUN, CR, GLC  COAGS:   Lab Results   Component Value Date    PTT 28 2005    INR 0.99 2005    FIBR 393 2005     POC:   Lab Results   Component  Value Date    HCG Negative 04/21/2023     HEPATIC: No results found for: ALBUMIN, PROTTOTAL, ALT, AST, GGT, ALKPHOS, BILITOTAL, BILIDIRECT, DONATO  OTHER: No results found for: PH, LACT, A1C, ROSENDO, PHOS, MAG, LIPASE, AMYLASE, TSH, T4, T3, CRP, SED    Anesthesia Plan    ASA Status:  1      Anesthesia Type: MAC.              Consents    Anesthesia Plan(s) and associated risks, benefits, and realistic alternatives discussed. Questions answered and patient/representative(s) expressed understanding.    - Discussed:     - Discussed with:  Patient         Postoperative Care    Pain management: Multi-modal analgesia.   PONV prophylaxis: Ondansetron (or other 5HT-3)     Comments:    Other Comments: No preoperative H&P             Selwyn Thomas MD

## 2023-04-21 NOTE — OP NOTE
Preoperative diagnosis: Recurrent bleeding from right shoulder soft tissue lesion.    Postoperative diagnosis: Same.    Procedure: Excision of soft tissue lesion right shoulder.    Surgeon: Serge Joshi M.D.   Assistant: Jose Vela M.D.     Anesthesia: Monitored anesthesia care with local.  Estimated blood loss about 10 mL.  Specimens: Soft tissue lesion.    Indications for procedure: This a 47-year-old female with a lesion that had recurrent bleeding.  It was sclerosed and draining but that continued with the same problem.  Excision is advised.    Procedure details: Patient was identified and then taken to the operating room and placed in supine position.  The right neck and shoulder were prepped and a sterile surgical field was created.  Preprocedure timeout was conducted.  Preoperative intravenous antibiotics were administered.    This lesion was located right at the junction of the neck and supraclavicular fossa.  First we made an ellipse to wang it.  The ellipse measured 1 cm x 3-1/2 cm.  Generous local anesthetic was administered.  The lesion was then sharply excised with a #15 blade.  Adequate hemostasis was confirmed and the wound was closed with 3-0 Vicryl 4-0 Monocryl followed by application of Mastisol and Steri-Strips.  The specimen was handed off for permanent pathology.    Counts of instruments, sponges and needles were noted to be correct.    Patient was taken to recovery room in stable condition.    I called the patient's  and updated him on her progress.

## 2023-04-21 NOTE — ANESTHESIA CARE TRANSFER NOTE
Patient: Melia Hernández    Procedure: Procedure(s):  EXCISION SOFT TISSUE LESION RIGHT SHOULDER       Diagnosis: Skin lesion [L98.9]  Diagnosis Additional Information: No value filed.    Anesthesia Type:   MAC     Note:    Oropharynx: oropharynx clear of all foreign objects  Level of Consciousness: awake  Oxygen Supplementation: face mask    Independent Airway: airway patency satisfactory and stable  Dentition: dentition unchanged  Vital Signs Stable: post-procedure vital signs reviewed and stable  Report to RN Given: handoff report given  Patient transferred to: PACU    Handoff Report: Identifed the Patient, Identified the Reponsible Provider, Reviewed the pertinent medical history, Discussed the surgical course, Reviewed Intra-OP anesthesia mangement and issues during anesthesia, Set expectations for post-procedure period and Allowed opportunity for questions and acknowledgement of understanding      Vitals:  Vitals Value Taken Time   BP     Temp     Pulse 89 04/21/23 1224   Resp 16 04/21/23 1224   SpO2 100 % 04/21/23 1224   Vitals shown include unvalidated device data.    Electronically Signed By: RODRI Stahl CRNA  April 21, 2023  12:26 PM

## 2023-04-21 NOTE — ANESTHESIA POSTPROCEDURE EVALUATION
Patient: Melia Hernández    Procedure: Procedure(s):  EXCISION SOFT TISSUE LESION RIGHT SHOULDER       Anesthesia Type:  MAC    Note:     Postop Pain Control: Uneventful            Sign Out: Well controlled pain   PONV:    Neuro/Psych: Uneventful            Sign Out: Acceptable/Baseline neuro status   Airway/Respiratory: Uneventful            Sign Out: Acceptable/Baseline resp. status   CV/Hemodynamics: Uneventful            Sign Out: Acceptable CV status; No obvious hypovolemia; No obvious fluid overload   Other NRE:    DID A NON-ROUTINE EVENT OCCUR?            Last vitals:  Vitals Value Taken Time   BP 99/52 04/21/23 1300   Temp     Pulse 87 04/21/23 1308   Resp 19 04/21/23 1308   SpO2 100 % 04/21/23 1308   Vitals shown include unvalidated device data.    Electronically Signed By: Selwyn Thomas MD  April 21, 2023  1:44 PM

## 2023-04-21 NOTE — BRIEF OP NOTE
[facility]    Brief Operative Note    Pre-operative diagnosis: Skin lesion [L98.9]  Post-operative diagnosis Same as pre-operative diagnosis    Procedure: Procedure(s):  EXCISION SOFT TISSUE LESION RIGHT SHOULDER  Surgeon: Surgeon(s) and Role:     * Serge Joshi MD - Primary     * Jose Vela MD - Resident - Assisting  Anesthesia: MAC with Local   Estimated Blood Loss: Minimal    Drains: None  Specimens:   ID Type Source Tests Collected by Time Destination   1 : soft tissue lesion on right neck Tissue Neck, Right SURGICAL PATHOLOGY EXAM Serge Joshi MD 4/21/2023 11:59 AM      Findings:   Vascular lesion of left shoulder  Complications: None.  Implants: * No implants in log *

## 2023-04-24 ENCOUNTER — TELEPHONE (OUTPATIENT)
Dept: OTHER | Facility: CLINIC | Age: 48
End: 2023-04-24
Payer: COMMERCIAL

## 2023-04-24 NOTE — TELEPHONE ENCOUNTER
----- Message from Serge Joshi MD sent at 4/21/2023 12:43 PM CDT -----  Regarding: follow up  Follow up in [place] on May 4th please  K    Pt already scheduled for May 8th in [place], pt to be called and offered May 4th in [place] instead per MD request and pt ease of location.     Pt called and coordinated change of appt to 5/4/23 at 3:30 in [place] with Dr. Joshi. Pt verbalized understanding.     KRUNAL CoffeyN, RN  [facility]  Office:  [phone] Fax: [phone]

## 2023-04-26 LAB
PATH REPORT.COMMENTS IMP SPEC: NORMAL
PATH REPORT.COMMENTS IMP SPEC: NORMAL
PATH REPORT.FINAL DX SPEC: NORMAL
PATH REPORT.GROSS SPEC: NORMAL
PATH REPORT.MICROSCOPIC SPEC OTHER STN: NORMAL
PATH REPORT.RELEVANT HX SPEC: NORMAL
PHOTO IMAGE: NORMAL

## 2023-05-02 NOTE — H&P
Presenting complaint: Soft tissue lesion in the right shoulder area.    History of presenting illness this is a very pleasant 47-year-old female who had been referred to us by Miri Alba PA-C.  It has been drained once previously in January but has recurred.  This appears quite vascular and therefore the patient was referred to vascular surgery service for further input and management.    Past medical history: Patient denies any other growths anywhere else in the body.  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    Past surgical history: LASEK in 2006.    Allergies: No known drug allergies.    On examination: She appears comfortable and she is in no acute distress.  Vital signs are reviewed.  HEENT: Head is atraumatic and normocephalic, mucosa pink.  Mental: Alert and oriented x4, judgment and insight are good.  Cardiac: Regular rate and rhythm, S1 plus S2 +0.  Chest: Clear to auscultation bilaterally.  Skin: Pedunculated skin lesion noted around the right shoulder and trapezius area.  It is well vascularized and clinically appears consistent with a benign vascular tumor.    Diagnosis: Right shoulder glomus tumor.    Plan: I explained the findings to the patient and detail.  We will proceed with surgical excision.

## 2023-05-04 ENCOUNTER — OFFICE VISIT (OUTPATIENT)
Dept: SURGERY | Facility: CLINIC | Age: 48
End: 2023-05-04
Payer: COMMERCIAL

## 2023-05-04 VITALS
SYSTOLIC BLOOD PRESSURE: 104 MMHG | DIASTOLIC BLOOD PRESSURE: 64 MMHG | OXYGEN SATURATION: 99 % | HEIGHT: 61 IN | HEART RATE: 86 BPM | WEIGHT: 127 LBS | RESPIRATION RATE: 16 BRPM | BODY MASS INDEX: 23.98 KG/M2

## 2023-05-04 DIAGNOSIS — Z09 SURGICAL FOLLOWUP VISIT: Primary | ICD-10-CM

## 2023-05-04 PROCEDURE — 99024 POSTOP FOLLOW-UP VISIT: CPT | Performed by: SURGERY

## 2023-05-08 NOTE — PROGRESS NOTES
Mrs. Hernández is a 47-year-old female who had a skin lesion excised around the right shoulder on 21 April 2023.  She has no complaints.  The pathology was that of a benign glomus tumor.    The surgical site is healing well.      She can follow-up as needed I have discussed the pathology findings with her in detail.

## 2023-05-11 ENCOUNTER — TRANSFERRED RECORDS (OUTPATIENT)
Dept: HEALTH INFORMATION MANAGEMENT | Facility: CLINIC | Age: 48
End: 2023-05-11

## 2023-05-11 LAB
CHOLESTEROL (EXTERNAL): 186 MG/DL (ref 100–199)
HDLC SERPL-MCNC: 56 MG/DL
LDL CHOLESTEROL CALCULATED (EXTERNAL): 110 MG/DL (ref 0–99)
PAP SMEAR - HIM PATIENT REPORTED: NEGATIVE
TRIGLYCERIDES (EXTERNAL): 114 MG/DL (ref 0–149)

## 2023-11-06 ASSESSMENT — ENCOUNTER SYMPTOMS
JOINT SWELLING: 0
HEMATOCHEZIA: 0
NAUSEA: 0
SORE THROAT: 0
CONSTIPATION: 0
NERVOUS/ANXIOUS: 0
HEADACHES: 0
BREAST MASS: 0
DYSURIA: 0
FEVER: 0
MYALGIAS: 0
EYE PAIN: 0
ARTHRALGIAS: 0
FREQUENCY: 0
COUGH: 0
PALPITATIONS: 0
DIZZINESS: 0
CHILLS: 0
ABDOMINAL PAIN: 0
DIARRHEA: 0
WEAKNESS: 0
SHORTNESS OF BREATH: 0
HEMATURIA: 0
HEARTBURN: 0
PARESTHESIAS: 0

## 2023-11-07 ENCOUNTER — OFFICE VISIT (OUTPATIENT)
Dept: FAMILY MEDICINE | Facility: CLINIC | Age: 48
End: 2023-11-07
Payer: COMMERCIAL

## 2023-11-07 VITALS
OXYGEN SATURATION: 100 % | WEIGHT: 128 LBS | RESPIRATION RATE: 16 BRPM | DIASTOLIC BLOOD PRESSURE: 66 MMHG | BODY MASS INDEX: 24.17 KG/M2 | TEMPERATURE: 98.2 F | HEIGHT: 61 IN | SYSTOLIC BLOOD PRESSURE: 124 MMHG | HEART RATE: 108 BPM

## 2023-11-07 DIAGNOSIS — Z00.00 ROUTINE GENERAL MEDICAL EXAMINATION AT A HEALTH CARE FACILITY: Primary | ICD-10-CM

## 2023-11-07 DIAGNOSIS — Z12.11 SCREEN FOR COLON CANCER: ICD-10-CM

## 2023-11-07 DIAGNOSIS — Z12.31 VISIT FOR SCREENING MAMMOGRAM: ICD-10-CM

## 2023-11-07 PROCEDURE — 90471 IMMUNIZATION ADMIN: CPT | Performed by: NURSE PRACTITIONER

## 2023-11-07 PROCEDURE — 90686 IIV4 VACC NO PRSV 0.5 ML IM: CPT | Performed by: NURSE PRACTITIONER

## 2023-11-07 PROCEDURE — 99396 PREV VISIT EST AGE 40-64: CPT | Mod: 25 | Performed by: NURSE PRACTITIONER

## 2023-11-07 RX ORDER — CHOLECALCIFEROL (VITAMIN D3) 50 MCG
TABLET ORAL
COMMUNITY

## 2023-11-07 ASSESSMENT — ENCOUNTER SYMPTOMS
ARTHRALGIAS: 0
HEADACHES: 0
HEMATOCHEZIA: 0
CHILLS: 0
CONSTIPATION: 0
COUGH: 0
BREAST MASS: 0
WEAKNESS: 0
ABDOMINAL PAIN: 0
NAUSEA: 0
JOINT SWELLING: 0
MYALGIAS: 0
PALPITATIONS: 0
HEARTBURN: 0
DYSURIA: 0
EYE PAIN: 0
PARESTHESIAS: 0
SHORTNESS OF BREATH: 0
HEMATURIA: 0
NERVOUS/ANXIOUS: 0
DIARRHEA: 0
DIZZINESS: 0
FEVER: 0
FREQUENCY: 0
SORE THROAT: 0

## 2023-11-07 NOTE — Clinical Note
Please abstract the following data from this visit with this patient into the appropriate field in Epic:  Tests that can be patient reported without a hard copy:  Mammogram done on this date: 5/10/2022 (approximately), by this group: sweta sanchez, results were normal.  and Pap smear done on this date: 5/10/2022 (approximately), by this group: sweta sanchez, results were normal.   Other Tests found in the patient's chart through Chart Review/Care Everywhere:  {Abstract Quality List (Optional):[id number]}  Note to Abstraction: If this section is blank, no results were found via Chart Review/Care Everywhere.

## 2023-11-07 NOTE — Clinical Note
Please abstract the following data from this visit with this patient into the appropriate field in Epic:  Tests that can be patient reported without a hard copy:  Pap smear done on this date: 5/11/2023 (approximately), by this group: Sofya Eubanks, results were WNL HPV-.   Other Tests found in the patient's chart through Chart Review/Care Everywhere:

## 2023-11-07 NOTE — PROGRESS NOTES
SUBJECTIVE:   CC: Melia is an 47 year old who presents for preventive health visit.       2023     3:11 PM   Additional Questions   Roomed by Dee ALFREDO CMA       Healthy Habits:     Getting at least 3 servings of Calcium per day:  NO    Bi-annual eye exam:  Yes    Dental care twice a year:  Yes    Sleep apnea or symptoms of sleep apnea:  Daytime drowsiness    Diet:  Regular (no restrictions)    Frequency of exercise:  2-3 days/week    Duration of exercise:  15-30 minutes    Taking medications regularly:  Yes    Medication side effects:  None    Additional concerns today:  No            Have you ever done Advance Care Planning? (For example, a Health Directive, POLST, or a discussion with a medical provider or your loved ones about your wishes): No, advance care planning information given to patient to review.  Patient declined advance care planning discussion at this time.    Social History     Tobacco Use    Smoking status: Never     Passive exposure: Never    Smokeless tobacco: Never   Substance Use Topics    Alcohol use: Yes     Comment: socially             2023     4:19 AM   Alcohol Use   Prescreen: >3 drinks/day or >7 drinks/week? No     Reviewed orders with patient.  Reviewed health maintenance and updated orders accordingly - Yes  Lab work is in process    Breast Cancer Screenin/6/2023     4:21 AM   Breast CA Risk Assessment (FHS-7)   Do you have a family history of breast, colon, or ovarian cancer? No / Unknown         Mammogram Screening: Recommended annual mammography  Pertinent mammograms are reviewed under the imaging tab.    History of abnormal Pap smear: NO - age 30-65 PAP every 5 years with negative HPV co-testing recommended     Reviewed and updated as needed this visit by clinical staff   Tobacco  Allergies  Meds  Problems  Med Hx  Surg Hx  Fam Hx          Reviewed and updated as needed this visit by Provider   Tobacco  Allergies  Meds  Problems  Med Hx  Surg Hx   "Fam Hx         Past Medical History:   Diagnosis Date    NO ACTIVE PROBLEMS       Past Surgical History:   Procedure Laterality Date    ENT SURGERY      PET    EXCISE LESION TRUNK Right 4/21/2023    Procedure: EXCISION SOFT TISSUE LESION RIGHT SHOULDER;  Surgeon: Serge Joshi MD;  Location:  OR    LABIALPLASTY N/A 6/26/2015    Procedure: LABIAPLASTY;  Surgeon: Carlotta Butcher MD;  [facility]    NO HISTORY OF SURGERY      wisdom teeth         Review of Systems   Constitutional:  Negative for chills and fever.   HENT:  Negative for congestion, ear pain, hearing loss and sore throat.    Eyes:  Negative for pain and visual disturbance.   Respiratory:  Negative for cough and shortness of breath.    Cardiovascular:  Negative for chest pain, palpitations and peripheral edema.   Gastrointestinal:  Negative for abdominal pain, constipation, diarrhea, heartburn, hematochezia and nausea.   Breasts:  Negative for tenderness, breast mass and discharge.   Genitourinary:  Negative for dysuria, frequency, genital sores, hematuria, pelvic pain, urgency, vaginal bleeding and vaginal discharge.   Musculoskeletal:  Negative for arthralgias, joint swelling and myalgias.   Neurological:  Negative for dizziness, weakness, headaches and paresthesias.   Psychiatric/Behavioral:  Negative for mood changes. The patient is not nervous/anxious.         OBJECTIVE:   /66   Pulse 108   Temp 98.2  F (36.8  C) (Tympanic)   Resp 16   Ht 1.549 m (5' 1\")   Wt 58.1 kg (128 lb)   LMP 11/07/2023 (Exact Date)   SpO2 100%   BMI 24.19 kg/m    Physical Exam  GENERAL: healthy, alert and no distress  EYES: Eyes grossly normal to inspection, PERRL and conjunctivae and sclerae normal  HENT: ear canals and TM's normal, nose and mouth without ulcers or lesions  NECK: no adenopathy, no asymmetry, masses, or scars and thyroid normal to palpation  RESP: lungs clear to auscultation - no rales, rhonchi or wheezes  BREAST: " normal without masses, tenderness or nipple discharge and no palpable axillary masses or adenopathy  CV: regular rate and rhythm, normal S1 S2, no S3 or S4, no murmur, click or rub, no peripheral edema and peripheral pulses strong  ABDOMEN: soft, nontender, no hepatosplenomegaly, no masses and bowel sounds normal  MS: no gross musculoskeletal defects noted, no edema  SKIN: no suspicious lesions or rashes  NEURO: Normal strength and tone, mentation intact and speech normal  PSYCH: mentation appears normal, affect normal/bright    Diagnostic Test Results:  Labs reviewed in Epic    ASSESSMENT/PLAN:   Melia was seen today for physical.    Diagnoses and all orders for this visit:    Routine general medical examination at a health care facility  -     REVIEW OF HEALTH MAINTENANCE PROTOCOL ORDERS  -     INFLUENZA VACCINE IM > 6 MONTHS VALENT IIV4 (AFLURIA/FLUZONE)  -     PRIMARY CARE FOLLOW-UP SCHEDULING; Future    Visit for screening mammogram  Done by sweta sanchez last spring-abstract.    Screen for colon cancer  -     Colonoscopy Screening  Referral; Future          Patient has been advised of split billing requirements and indicates understanding: Yes      COUNSELING:  Reviewed preventive health counseling, as reflected in patient instructions        She reports that she has never smoked. She has never been exposed to tobacco smoke. She has never used smokeless tobacco.          Eun Schroeder, CNP  [facility] [place]

## 2024-01-14 ENCOUNTER — HEALTH MAINTENANCE LETTER (OUTPATIENT)
Age: 49
End: 2024-01-14

## 2024-06-15 ENCOUNTER — TRANSFERRED RECORDS (OUTPATIENT)
Dept: MULTI SPECIALTY CLINIC | Facility: CLINIC | Age: 49
End: 2024-06-15

## 2024-08-11 ENCOUNTER — HEALTH MAINTENANCE LETTER (OUTPATIENT)
Age: 49
End: 2024-08-11

## 2024-09-30 ENCOUNTER — TRANSFERRED RECORDS (OUTPATIENT)
Dept: HEALTH INFORMATION MANAGEMENT | Facility: CLINIC | Age: 49
End: 2024-09-30
Payer: COMMERCIAL

## 2024-11-03 SDOH — HEALTH STABILITY: PHYSICAL HEALTH: ON AVERAGE, HOW MANY DAYS PER WEEK DO YOU ENGAGE IN MODERATE TO STRENUOUS EXERCISE (LIKE A BRISK WALK)?: 0 DAYS

## 2024-11-03 SDOH — HEALTH STABILITY: PHYSICAL HEALTH: ON AVERAGE, HOW MANY MINUTES DO YOU ENGAGE IN EXERCISE AT THIS LEVEL?: 0 MIN

## 2024-11-03 ASSESSMENT — SOCIAL DETERMINANTS OF HEALTH (SDOH): HOW OFTEN DO YOU GET TOGETHER WITH FRIENDS OR RELATIVES?: ONCE A WEEK

## 2024-11-07 ENCOUNTER — OFFICE VISIT (OUTPATIENT)
Dept: FAMILY MEDICINE | Facility: CLINIC | Age: 49
End: 2024-11-07
Attending: NURSE PRACTITIONER
Payer: COMMERCIAL

## 2024-11-07 VITALS
HEART RATE: 83 BPM | OXYGEN SATURATION: 100 % | RESPIRATION RATE: 14 BRPM | DIASTOLIC BLOOD PRESSURE: 70 MMHG | HEIGHT: 62 IN | BODY MASS INDEX: 23.74 KG/M2 | TEMPERATURE: 97.7 F | SYSTOLIC BLOOD PRESSURE: 107 MMHG | WEIGHT: 129 LBS

## 2024-11-07 DIAGNOSIS — G43.909 MIGRAINE WITHOUT STATUS MIGRAINOSUS, NOT INTRACTABLE, UNSPECIFIED MIGRAINE TYPE: ICD-10-CM

## 2024-11-07 DIAGNOSIS — Z13.220 SCREENING CHOLESTEROL LEVEL: ICD-10-CM

## 2024-11-07 DIAGNOSIS — Z13.21 ENCOUNTER FOR VITAMIN DEFICIENCY SCREENING: ICD-10-CM

## 2024-11-07 DIAGNOSIS — Z12.11 SCREEN FOR COLON CANCER: ICD-10-CM

## 2024-11-07 DIAGNOSIS — Z00.00 ROUTINE GENERAL MEDICAL EXAMINATION AT A HEALTH CARE FACILITY: Primary | ICD-10-CM

## 2024-11-07 DIAGNOSIS — Z13.29 THYROID DISORDER SCREENING: ICD-10-CM

## 2024-11-07 DIAGNOSIS — Z12.31 ENCOUNTER FOR SCREENING MAMMOGRAM FOR MALIGNANT NEOPLASM OF BREAST: ICD-10-CM

## 2024-11-07 DIAGNOSIS — Z13.0 SCREENING FOR DEFICIENCY ANEMIA: ICD-10-CM

## 2024-11-07 DIAGNOSIS — Z13.1 SCREENING FOR DIABETES MELLITUS: ICD-10-CM

## 2024-11-07 LAB
ALBUMIN SERPL BCG-MCNC: 4.1 G/DL (ref 3.5–5.2)
ALP SERPL-CCNC: 49 U/L (ref 40–150)
ALT SERPL W P-5'-P-CCNC: 13 U/L (ref 0–50)
ANION GAP SERPL CALCULATED.3IONS-SCNC: 11 MMOL/L (ref 7–15)
AST SERPL W P-5'-P-CCNC: 24 U/L (ref 0–45)
BILIRUB SERPL-MCNC: 0.4 MG/DL
BUN SERPL-MCNC: 11.3 MG/DL (ref 6–20)
CALCIUM SERPL-MCNC: 8.7 MG/DL (ref 8.8–10.4)
CHLORIDE SERPL-SCNC: 105 MMOL/L (ref 98–107)
CHOLEST SERPL-MCNC: 219 MG/DL
CREAT SERPL-MCNC: 0.71 MG/DL (ref 0.51–0.95)
EGFRCR SERPLBLD CKD-EPI 2021: >90 ML/MIN/1.73M2
ERYTHROCYTE [DISTWIDTH] IN BLOOD BY AUTOMATED COUNT: 11.9 % (ref 10–15)
FASTING STATUS PATIENT QL REPORTED: YES
FASTING STATUS PATIENT QL REPORTED: YES
GLUCOSE SERPL-MCNC: 88 MG/DL (ref 70–99)
HCO3 SERPL-SCNC: 23 MMOL/L (ref 22–29)
HCT VFR BLD AUTO: 41.1 % (ref 35–47)
HDLC SERPL-MCNC: 68 MG/DL
HGB BLD-MCNC: 13.8 G/DL (ref 11.7–15.7)
LDLC SERPL CALC-MCNC: 139 MG/DL
MCH RBC QN AUTO: 32 PG (ref 26.5–33)
MCHC RBC AUTO-ENTMCNC: 33.6 G/DL (ref 31.5–36.5)
MCV RBC AUTO: 95 FL (ref 78–100)
NONHDLC SERPL-MCNC: 151 MG/DL
PLATELET # BLD AUTO: 316 10E3/UL (ref 150–450)
POTASSIUM SERPL-SCNC: 4.1 MMOL/L (ref 3.4–5.3)
PROT SERPL-MCNC: 7.3 G/DL (ref 6.4–8.3)
RBC # BLD AUTO: 4.31 10E6/UL (ref 3.8–5.2)
SODIUM SERPL-SCNC: 139 MMOL/L (ref 135–145)
TRIGL SERPL-MCNC: 61 MG/DL
TSH SERPL DL<=0.005 MIU/L-ACNC: 1.85 UIU/ML (ref 0.3–4.2)
VIT D+METAB SERPL-MCNC: 60 NG/ML (ref 20–50)
WBC # BLD AUTO: 6 10E3/UL (ref 4–11)

## 2024-11-07 PROCEDURE — 85027 COMPLETE CBC AUTOMATED: CPT | Performed by: NURSE PRACTITIONER

## 2024-11-07 PROCEDURE — 99396 PREV VISIT EST AGE 40-64: CPT | Mod: 25 | Performed by: NURSE PRACTITIONER

## 2024-11-07 PROCEDURE — 82306 VITAMIN D 25 HYDROXY: CPT | Performed by: NURSE PRACTITIONER

## 2024-11-07 PROCEDURE — 90471 IMMUNIZATION ADMIN: CPT | Performed by: NURSE PRACTITIONER

## 2024-11-07 PROCEDURE — 90656 IIV3 VACC NO PRSV 0.5 ML IM: CPT | Performed by: NURSE PRACTITIONER

## 2024-11-07 PROCEDURE — 36415 COLL VENOUS BLD VENIPUNCTURE: CPT | Performed by: NURSE PRACTITIONER

## 2024-11-07 PROCEDURE — 80061 LIPID PANEL: CPT | Performed by: NURSE PRACTITIONER

## 2024-11-07 PROCEDURE — 80053 COMPREHEN METABOLIC PANEL: CPT | Performed by: NURSE PRACTITIONER

## 2024-11-07 PROCEDURE — 91320 SARSCV2 VAC 30MCG TRS-SUC IM: CPT | Performed by: NURSE PRACTITIONER

## 2024-11-07 PROCEDURE — 90480 ADMN SARSCOV2 VAC 1/ONLY CMP: CPT | Performed by: NURSE PRACTITIONER

## 2024-11-07 PROCEDURE — 84443 ASSAY THYROID STIM HORMONE: CPT | Performed by: NURSE PRACTITIONER

## 2024-11-07 NOTE — PROGRESS NOTES
Preventive Care Visit  [facility] [place]  Eun Schroeder, KD, Nurse Practitioner - Family  Nov 7, 2024      Assessment & Plan     Routine general medical examination at a health care facility  - PRIMARY CARE FOLLOW-UP SCHEDULING  - INFLUENZA VACCINE,SPLIT VIRUS,TRIVALENT,PF(FLUZONE)  - COVID-19 12+ (PFIZER)  - REVIEW OF HEALTH MAINTENANCE PROTOCOL ORDERS    Screen for colon cancer  Insurance doesn't cover until age 50.    Thyroid disorder screening  - TSH with free T4 reflex  - TSH with free T4 reflex    Screening for diabetes mellitus  - Comprehensive metabolic panel  - Comprehensive metabolic panel    Screening cholesterol level  - Lipid panel reflex to direct LDL Fasting  - Lipid panel reflex to direct LDL Fasting    Encounter for vitamin deficiency screening  - Vitamin D Deficiency  - Vitamin D Deficiency    Encounter for screening mammogram for malignant neoplasm of breast  Request from [facility].    Screening for deficiency anemia  - CBC with platelets  - CBC with platelets    Migraine without status migrainosus, not intractable, unspecified migraine type  Trial.  - ubrogepant (UBRELVY) 100 MG tablet  Dispense: 15 tablet; Refill: 1              Counseling  Appropriate preventive services were addressed with this patient via screening, questionnaire, or discussion as appropriate for fall prevention, nutrition, physical activity, Tobacco-use cessation, social engagement, weight loss and cognition.  Checklist reviewing preventive services available has been given to the patient.  Reviewed patient's diet, addressing concerns and/or questions.       See Patient Instructions    Reggie Cárdenas is a 48 year old, presenting for the following:  Physical (Patient would also like to get tested for hormone imbalance. )        11/7/2024     8:09 AM   Additional Questions   Roomed by Ben        Via the Health Maintenance questionnaire, the patient has reported the following services have been  completed -Mammogram: [facility] 2024-06-15, this information has been sent to the abstraction team.    HPI    Health Care Directive  Patient does not have a Health Care Directive: Discussed advance care planning with patient; however, patient declined at this time.      11/3/2024   General Health   How would you rate your overall physical health? Good   Feel stress (tense, anxious, or unable to sleep) To some extent      (!) STRESS CONCERN      11/3/2024   Nutrition   Three or more servings of calcium each day? (!) NO   Diet: Regular (no restrictions)   How many servings of fruit and vegetables per day? (!) 2-3   How many sweetened beverages each day? 0-1            11/3/2024   Exercise   Days per week of moderate/strenous exercise 0 days   Average minutes spent exercising at this level 0 min      (!) EXERCISE CONCERN      11/3/2024   Social Factors   Frequency of gathering with friends or relatives Once a week   Worry food won't last until get money to buy more No   Food not last or not have enough money for food? No   Do you have housing? (Housing is defined as stable permanent housing and does not include staying ouside in a car, in a tent, in an abandoned building, in an overnight shelter, or couch-surfing.) Yes   Are you worried about losing your housing? No   Lack of transportation? No   Unable to get utilities (heat,electricity)? No            11/3/2024   Dental   Dentist two times every year? Yes            11/3/2024   TB Screening   Were you born outside of the [place]? No            Today's PHQ-2 Score:       11/7/2024     8:07 AM   PHQ-2 ( 1999 [employer])   Q1: Little interest or pleasure in doing things 0    Q2: Feeling down, depressed or hopeless 0    PHQ-2 Score 0    Q1: Little interest or pleasure in doing things Not at all   Q2: Feeling down, depressed or hopeless Not at all   PHQ-2 Score 0       Patient-reported           11/3/2024   Substance Use   Alcohol more than 3/day or more than 7/wk No   Do  "you use any other substances recreationally? No        Social History     Tobacco Use    Smoking status: Never     Passive exposure: Never    Smokeless tobacco: Never   Substance Use Topics    Alcohol use: Yes     Comment: socially    Drug use: No          Mammogram Screening - Mammogram every 1-2 years updated in Health Maintenance based on mutual decision making        11/3/2024   STI Screening   New sexual partner(s) since last STI/HIV test? No        History of abnormal Pap smear: No - age 30- 64 PAP with HPV every 5 years recommended       ASCVD Risk   The 10-year ASCVD risk score (Jules JERRY, et al., 2019) is: 0.5%    Values used to calculate the score:      Age: 48 years      Sex: Female      Is Non- : No      Diabetic: No      Tobacco smoker: No      Systolic Blood Pressure: 107 mmHg      Is BP treated: No      HDL Cholesterol: 56 mg/dL      Total Cholesterol: 186 mg/dL        11/3/2024   Contraception/Family Planning   Questions about contraception or family planning No           Reviewed and updated as needed this visit by Provider                    Past Medical History:   Diagnosis Date    NO ACTIVE PROBLEMS      Past Surgical History:   Procedure Laterality Date    ENT SURGERY      PET    EXCISE LESION TRUNK Right 4/21/2023    Procedure: EXCISION SOFT TISSUE LESION RIGHT SHOULDER;  Surgeon: Serge Joshi MD;  Location:  OR    LABIALPLASTY N/A 6/26/2015    Procedure: LABIAPLASTY;  Surgeon: Carlotta Butcher MD;  Location: [facility]    NO HISTORY OF SURGERY      wisdom teeth           Review of Systems  Constitutional, HEENT, cardiovascular, pulmonary, GI, , musculoskeletal, neuro, skin, endocrine and psych systems are negative, except as otherwise noted.     Objective    Exam  /70   Pulse 83   Temp 97.7  F (36.5  C) (Temporal)   Resp 14   Ht 1.562 m (5' 1.5\")   Wt 58.5 kg (129 lb)   LMP 11/04/2024 (Exact Date)   SpO2 100%   BMI 23.98 kg/m   " "  Estimated body mass index is 23.98 kg/m  as calculated from the following:    Height as of this encounter: 1.562 m (5' 1.5\").    Weight as of this encounter: 58.5 kg (129 lb).    Physical Exam  GENERAL: alert and no distress  EYES: Eyes grossly normal to inspection, PERRL and conjunctivae and sclerae normal  HENT: ear canals and TM's normal, nose and mouth without ulcers or lesions  NECK: no adenopathy, no asymmetry, masses, or scars  RESP: lungs clear to auscultation - no rales, rhonchi or wheezes  CV: regular rate and rhythm, normal S1 S2, no S3 or S4, no murmur, click or rub, no peripheral edema  ABDOMEN: soft, nontender, no hepatosplenomegaly, no masses and bowel sounds normal  MS: no gross musculoskeletal defects noted, no edema  SKIN: no suspicious lesions or rashes  NEURO: Normal strength and tone, mentation intact and speech normal  PSYCH: mentation appears normal, affect normal/bright        Signed Electronically by: Eun Schroeder CNP    "

## 2024-11-07 NOTE — PATIENT INSTRUCTIONS
Magnesium glycinate supplement or magnesium l-threonate.    L-theanine at night 200-500 mg bedtime.

## 2024-11-12 ENCOUNTER — TELEPHONE (OUTPATIENT)
Dept: FAMILY MEDICINE | Facility: CLINIC | Age: 49
End: 2024-11-12
Payer: COMMERCIAL

## 2024-11-12 DIAGNOSIS — G43.909 MIGRAINE WITHOUT STATUS MIGRAINOSUS, NOT INTRACTABLE, UNSPECIFIED MIGRAINE TYPE: Primary | ICD-10-CM

## 2024-11-12 NOTE — TELEPHONE ENCOUNTER
Prior Authorization Retail Medication Request    Medication/Dose: UBRELVY 100MG TABLETS  Diagnosis and ICD code (if different than what is on RX):    New/renewal/insurance change PA/secondary ins. PA:  Previously Tried and Failed:    Rationale:      Insurance   Primary: [employer]  Insurance ID:  [id number]     Secondary (if applicable):  Insurance ID:      Pharmacy Information (if different than what is on RX)  Name:  [facility]  Phone:  [phone]  Fax:[phone]    Clinic Information  Preferred routing pool for dept communication:

## 2024-11-15 NOTE — TELEPHONE ENCOUNTER
PA Initiation    Medication: UBRELVY 100 MG PO TABS  Insurance Company: [employer] Non-Specialty PA's - Phone [phone] Fax [phone]  Pharmacy Filling the Rx: [id number] - [place], [place] - [address]  Filling Pharmacy Phone: [phone]  Filling Pharmacy Fax: [phone]  Start Date: 11/15/2024

## 2024-11-16 NOTE — TELEPHONE ENCOUNTER
PRIOR AUTHORIZATION DENIED    Medication: UBRELVY 100 MG PO TABS  Insurance Company: [employer] Non-Specialty PA's - Phone [phone] Fax [phone]  Denial Date: 11/15/2024  Denial Reason(s):       Appeal Information:         Patient Notified: NO

## 2024-11-18 RX ORDER — SUMATRIPTAN SUCCINATE 25 MG/1
25 TABLET ORAL
Qty: 18 TABLET | Refills: 3 | Status: SHIPPED | OUTPATIENT
Start: 2024-11-18

## (undated) DEVICE — SOL WATER IRRIG 1000ML BOTTLE 2F7114

## (undated) DEVICE — GLOVE BIOGEL PI MICRO INDICATOR UNDERGLOVE SZ 8.0 48980

## (undated) DEVICE — DRSG STERI STRIP 1/2X4" R1547

## (undated) DEVICE — SYR 03ML LL W/O NDL 309657

## (undated) DEVICE — LINEN TOWEL PACK X5 5464

## (undated) DEVICE — DRSG GAUZE 4X4" 8044

## (undated) DEVICE — NDL 19GA 1.5"

## (undated) DEVICE — SOL NACL 0.9% IRRIG 1000ML BOTTLE 2F7124

## (undated) DEVICE — DRSG STERI STRIP 1/2X4" B1557

## (undated) DEVICE — ESU PENCIL W/SMOKE EVAC NEPTUNE STRYKER 0703-046-000

## (undated) DEVICE — ESU ELEC BLADE 2.75" COATED/INSULATED E1455

## (undated) DEVICE — PREP CHLORAPREP 26ML TINTED HI-LITE ORANGE 930815

## (undated) DEVICE — DRAPE EXTREMITY W/ARMBOARD 29405

## (undated) DEVICE — DECANTER VIAL 2006S

## (undated) DEVICE — PACK MINOR SBA15MIFSE

## (undated) RX ORDER — CEFAZOLIN SODIUM/WATER 3 G/30 ML
SYRINGE (ML) INTRAVENOUS
Status: DISPENSED
Start: 2023-04-21

## (undated) RX ORDER — ONDANSETRON 2 MG/ML
INJECTION INTRAMUSCULAR; INTRAVENOUS
Status: DISPENSED
Start: 2023-04-21

## (undated) RX ORDER — PROPOFOL 10 MG/ML
INJECTION, EMULSION INTRAVENOUS
Status: DISPENSED
Start: 2023-04-21

## (undated) RX ORDER — CEFAZOLIN SODIUM/WATER 2 G/20 ML
SYRINGE (ML) INTRAVENOUS
Status: DISPENSED
Start: 2023-04-21

## (undated) RX ORDER — LIDOCAINE HYDROCHLORIDE AND EPINEPHRINE 10; 10 MG/ML; UG/ML
INJECTION, SOLUTION INFILTRATION; PERINEURAL
Status: DISPENSED
Start: 2023-04-21

## (undated) RX ORDER — LIDOCAINE HYDROCHLORIDE 10 MG/ML
INJECTION, SOLUTION INFILTRATION; PERINEURAL
Status: DISPENSED
Start: 2023-04-21

## (undated) RX ORDER — BUPIVACAINE HYDROCHLORIDE 5 MG/ML
INJECTION, SOLUTION EPIDURAL; INTRACAUDAL
Status: DISPENSED
Start: 2023-04-21

## (undated) RX ORDER — FENTANYL CITRATE 50 UG/ML
INJECTION, SOLUTION INTRAMUSCULAR; INTRAVENOUS
Status: DISPENSED
Start: 2023-04-21